# Patient Record
Sex: MALE | Race: BLACK OR AFRICAN AMERICAN
[De-identification: names, ages, dates, MRNs, and addresses within clinical notes are randomized per-mention and may not be internally consistent; named-entity substitution may affect disease eponyms.]

---

## 2017-10-11 ENCOUNTER — HOSPITAL ENCOUNTER (EMERGENCY)
Dept: HOSPITAL 62 - ER | Age: 27
Discharge: HOME | End: 2017-10-11
Payer: MEDICARE

## 2017-10-11 VITALS — SYSTOLIC BLOOD PRESSURE: 124 MMHG | DIASTOLIC BLOOD PRESSURE: 81 MMHG
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
## 2017-10-11 DIAGNOSIS — S81.012A: Primary | ICD-10-CM

## 2017-10-11 DIAGNOSIS — F17.200: ICD-10-CM

## 2017-10-11 DIAGNOSIS — M25.562: ICD-10-CM

## 2017-10-11 PROCEDURE — 12001 RPR S/N/AX/GEN/TRNK 2.5CM/<: CPT

## 2017-10-11 PROCEDURE — 99283 EMERGENCY DEPT VISIT LOW MDM: CPT

## 2017-10-11 PROCEDURE — 73560 X-RAY EXAM OF KNEE 1 OR 2: CPT

## 2017-10-11 PROCEDURE — 0HQLXZZ REPAIR LEFT LOWER LEG SKIN, EXTERNAL APPROACH: ICD-10-PCS
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
## 2017-10-11 NOTE — RADIOLOGY REPORT ([facility])
EXAM DESCRIPTION:  KNEE LEFT 2 VIEWS



COMPLETED DATE/TIME:  10/11/2017 11:10 am



REASON FOR STUDY:  puncture



COMPARISON:  None.



NUMBER OF VIEWS:  Two views.



TECHNIQUE:  AP and lateral radiographic images acquired of the left knee.



LIMITATIONS:  None.



FINDINGS:  MINERALIZATION: Normal.

BONES: No acute fracture or dislocation.  There are what appear to be posttraumatic changes patella.

JOINT: No effusion.

SOFT TISSUES: No soft tissue swelling.  No radio-opaque foreign body.

OTHER: No other significant finding.



IMPRESSION:  No acute abnormality.  Findings as described.



TECHNICAL DOCUMENTATION:  JOB ID:  [id number]

 2011 [employer]- All Rights Reserved

## 2018-03-13 ENCOUNTER — HOSPITAL ENCOUNTER (EMERGENCY)
Dept: HOSPITAL 62 - ER | Age: 28
LOS: 1 days | Discharge: LEFT BEFORE DISCHARGE | End: 2018-03-14
Payer: MEDICARE

## 2018-03-13 VITALS — SYSTOLIC BLOOD PRESSURE: 132 MMHG | DIASTOLIC BLOOD PRESSURE: 80 MMHG

## 2018-03-13 DIAGNOSIS — Z53.21: Primary | ICD-10-CM

## 2019-12-27 ENCOUNTER — HOSPITAL ENCOUNTER (EMERGENCY)
Dept: HOSPITAL 62 - ER | Age: 29
Discharge: LEFT BEFORE DISCHARGE | End: 2019-12-27
Payer: MEDICARE

## 2019-12-27 VITALS — SYSTOLIC BLOOD PRESSURE: 106 MMHG | DIASTOLIC BLOOD PRESSURE: 61 MMHG

## 2019-12-27 DIAGNOSIS — Z53.21: Primary | ICD-10-CM
